# Patient Record
Sex: FEMALE | Race: WHITE | NOT HISPANIC OR LATINO | Employment: FULL TIME | URBAN - METROPOLITAN AREA
[De-identification: names, ages, dates, MRNs, and addresses within clinical notes are randomized per-mention and may not be internally consistent; named-entity substitution may affect disease eponyms.]

---

## 2019-05-01 ENCOUNTER — TELEPHONE (OUTPATIENT)
Dept: NEUROLOGY | Facility: CLINIC | Age: 34
End: 2019-05-01

## 2019-09-26 RX ORDER — CYCLOBENZAPRINE HCL 10 MG
10 TABLET ORAL
COMMUNITY
Start: 2019-04-05

## 2019-09-26 RX ORDER — IBUPROFEN 800 MG/1
800 TABLET ORAL
COMMUNITY
Start: 2019-06-14

## 2019-09-26 RX ORDER — LEVOCETIRIZINE DIHYDROCHLORIDE 5 MG/1
5 TABLET, FILM COATED ORAL
COMMUNITY
Start: 2017-08-23

## 2019-09-26 RX ORDER — METOCLOPRAMIDE 10 MG/1
10 TABLET ORAL
COMMUNITY
Start: 2019-05-07

## 2019-09-26 RX ORDER — MONTELUKAST SODIUM 10 MG/1
10 TABLET ORAL
COMMUNITY
Start: 2019-09-09

## 2019-09-26 RX ORDER — OLOPATADINE HYDROCHLORIDE 2 MG/ML
1 SOLUTION/ DROPS OPHTHALMIC
COMMUNITY
Start: 2019-06-14

## 2019-09-26 RX ORDER — NARATRIPTAN 2.5 MG/1
2.5 TABLET ORAL
COMMUNITY
Start: 2019-05-07

## 2019-09-26 RX ORDER — TRIAMCINOLONE ACETONIDE 0.1 %
PASTE (GRAM) DENTAL
COMMUNITY
Start: 2019-07-22

## 2019-09-26 RX ORDER — ACETAMINOPHEN AND CODEINE PHOSPHATE 300; 30 MG/1; MG/1
1 TABLET ORAL
COMMUNITY
Start: 2019-05-07

## 2020-09-29 DIAGNOSIS — M25.471 EFFUSION, RIGHT ANKLE: ICD-10-CM

## 2020-09-29 DIAGNOSIS — M25.571 PAIN IN RIGHT ANKLE AND JOINTS OF RIGHT FOOT: ICD-10-CM

## 2020-10-07 ENCOUNTER — HOSPITAL ENCOUNTER (OUTPATIENT)
Dept: MRI IMAGING | Facility: HOSPITAL | Age: 35
Discharge: HOME | End: 2020-10-07
Payer: COMMERCIAL

## 2020-10-07 DIAGNOSIS — M25.471 EFFUSION, RIGHT ANKLE: ICD-10-CM

## 2020-10-07 DIAGNOSIS — M25.571 PAIN IN RIGHT ANKLE AND JOINTS OF RIGHT FOOT: ICD-10-CM

## 2020-10-07 PROCEDURE — 73721 MRI JNT OF LWR EXTRE W/O DYE: CPT

## 2021-01-07 ENCOUNTER — IMMUNIZATIONS (OUTPATIENT)
Dept: FAMILY MEDICINE CLINIC | Facility: HOSPITAL | Age: 36
End: 2021-01-07

## 2021-01-07 DIAGNOSIS — Z23 ENCOUNTER FOR IMMUNIZATION: ICD-10-CM

## 2021-01-07 PROCEDURE — 0011A SARS-COV-2 / COVID-19 MRNA VACCINE (MODERNA) 100 MCG: CPT

## 2021-01-07 PROCEDURE — 91301 SARS-COV-2 / COVID-19 MRNA VACCINE (MODERNA) 100 MCG: CPT

## 2021-02-03 ENCOUNTER — IMMUNIZATIONS (OUTPATIENT)
Dept: FAMILY MEDICINE CLINIC | Facility: HOSPITAL | Age: 36
End: 2021-02-03

## 2021-02-03 DIAGNOSIS — Z23 ENCOUNTER FOR IMMUNIZATION: Primary | ICD-10-CM

## 2021-02-03 PROCEDURE — 0012A SARS-COV-2 / COVID-19 MRNA VACCINE (MODERNA) 100 MCG: CPT

## 2021-02-03 PROCEDURE — 91301 SARS-COV-2 / COVID-19 MRNA VACCINE (MODERNA) 100 MCG: CPT

## 2021-03-01 DIAGNOSIS — M79.89 OTHER SPECIFIED SOFT TISSUE DISORDERS: ICD-10-CM

## 2021-03-01 DIAGNOSIS — M79.604 PAIN IN RIGHT LEG: ICD-10-CM

## 2021-03-02 ENCOUNTER — HOSPITAL ENCOUNTER (OUTPATIENT)
Dept: NON INVASIVE DIAGNOSTICS | Facility: HOSPITAL | Age: 36
Discharge: HOME | End: 2021-03-02
Payer: COMMERCIAL

## 2021-03-02 DIAGNOSIS — M79.604 PAIN IN RIGHT LEG: ICD-10-CM

## 2021-03-02 DIAGNOSIS — M79.89 OTHER SPECIFIED SOFT TISSUE DISORDERS: ICD-10-CM

## 2021-03-02 PROCEDURE — 93971 EXTREMITY STUDY: CPT | Performed by: SURGERY

## 2021-03-02 PROCEDURE — 93971 EXTREMITY STUDY: CPT

## 2021-03-05 DIAGNOSIS — M25.561 PAIN IN RIGHT KNEE: ICD-10-CM

## 2021-03-05 DIAGNOSIS — M25.461 EFFUSION, RIGHT KNEE: ICD-10-CM

## 2023-01-31 ENCOUNTER — EVALUATION (OUTPATIENT)
Dept: PHYSICAL THERAPY | Facility: CLINIC | Age: 38
End: 2023-01-31

## 2023-01-31 VITALS — SYSTOLIC BLOOD PRESSURE: 100 MMHG | HEART RATE: 55 BPM | DIASTOLIC BLOOD PRESSURE: 64 MMHG | OXYGEN SATURATION: 98 %

## 2023-01-31 DIAGNOSIS — M43.10 RETROLISTHESIS: ICD-10-CM

## 2023-01-31 DIAGNOSIS — M54.50 LUMBAR SPINE PAIN: Primary | ICD-10-CM

## 2023-01-31 NOTE — LETTER
2023    Mireya Newman MD  [address] [address]    Patient: Gustavo Mosre   YOB: 1985   Date of Visit: 2023     Encounter Diagnosis     ICD-10-CM    1  Lumbar spine pain  M54 50       2  Retrolisthesis  M43 10           Dear Dr Mosquera Congress: Thank you for your recent referral of Gustavo Moser  Please review the attached evaluation summary from [address] [place] [address] recent visit  Please verify that you agree with the plan of care by signing the attached order  If you have any questions or concerns, please do not hesitate to call  I sincerely appreciate the opportunity to share in the care of one of your patients and hope to have another opportunity to work with you in the near future  Sincerely,    Chiquis Mccarthy, PT      Referring Provider:      I certify that I have read the below Plan of Care and certify the need for these services furnished under this plan of treatment while under my care  Mireya Newman MD  [address] [address]  Via Fax: [phone]          PT Evaluation     Today's date: 2023  Patient name: Gustavo Moser  : 1985  MRN: [id number]  Referring provider: Benita Ibanez MD  Dx:   Encounter Diagnosis     ICD-10-CM    1  Lumbar spine pain  M54 50       2  Retrolisthesis  M43 10                      Assessment  Assessment details: 41 yo female referred to PT for  Thoracolumbar pain, xray finding of retrolisthesis, thoracic scoliosis with impairments of pain,  global limitations with trunk ROM secondary to pain with provocation, decreased B LE flexibility and core strength, hypertonicity R thoracolumbar PVMs  Pt evaluation completed and treatment initiated today with patient education, gentle ROM exercises, core stabilization exercises and  Stretching of bilateral hamstrings  Pt was educated in proper lifting techniques and provided with HEP this date with ability to demonstrate return  Pt will benefit from skilled PT to achieve goals of care  Impairments: abnormal or restricted ROM, lacks appropriate home exercise program, pain with function and poor body mechanics    Symptom irritability: highUnderstanding of Dx/Px/POC: good   Prognosis: good    Goals  STG  2-4 weeks  1  Pt  Able to demonstrate proper lifting techniques with ability to carry 10-15 pounds waist to floor , pain no greater than 1/10  2  Pt  To demonstrate increase ROM of  thoraco lumbar spine L SB with no radiation of pain to allow for improved tolerance to stand and sit  20-30 min  LTG  4-6 weeks  1  Pt  To demonstrate increase in  Core strength to  4/5 to 4+/5 to allow ADLs with pain no greater than 1/10 Lumbar spine without radiation  2  Pt  To demonstrate  Increase in flexibility of B HS to  WNLs to allow improved body mechanics for  ADLs  3  Resolve of hypertonicity of R thoracolumbar  PVMs to allow return to full activity with children, work and home keeping with painno greater than 1/10    Plan  Patient would benefit from: skilled physical therapy  Planned modality interventions: cryotherapy and thermotherapy: hydrocollator packs  Planned therapy interventions: abdominal trunk stabilization, manual therapy, patient education, postural training, home exercise program, flexibility, body mechanics training and therapeutic exercise  Frequency: 2x week  Duration in weeks: 6  Plan of Care beginning date: 1/31/2023  Plan of Care expiration date: 3/14/2023  Treatment plan discussed with: patient        Subjective Evaluation    History of Present Illness  Date of onset: 1/18/2023 (1/18/23)  Mechanism of injury:  Onset of R  back pain lifting young  child out of bed in am at home while bending and twisting to L  travleld to work and pain began to radiate to distally to R hip, lateral leg and  lateral ankle/foot  Was [facility] by  [facility] and had xrays and tx wiith naproxen and ordered PT  Pt  Notes sx same   Denies any sensory irritation, weakness, changes in bowel or bladder  Not a recurrent problem   Quality of life: excellent    Pain  Current pain ratin  At best pain rating: 3  At worst pain ratin  Location:   R lower back, radiated to lateral hip with radiation into leg  when stands  Quality: pressure and burning  Relieving factors: change in position  Progression: no change    Social Support  Steps to enter house: no  Stairs in house: yes   Lives in: multiple-level home  Lives with: spouse and young children    Employment status: working  Hand dominance: left  Exercise history: 3 miles  walking 1 x per week    Treatments  No previous or current treatments  Patient Goals  Patient goals for therapy: decreased pain and increased motion  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, 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        Objective     Concurrent Complaints  Negative for night pain, disturbed sleep, bladder dysfunction, bowel dysfunction, saddle (S4) numbness, cardiac problem, kidney problem, gallbladder problem, stomach problem, ulcer, appendix problem, spleen problem, pancreas problem, history of cancer, history of trauma and infection    Static Posture   General Observations  Scoliosis  Comments  Compensated thoracolumbar scoliosis , increased height R shoulder/scapula    Palpation     Right   Hypertonic in the erector spinae  Tenderness of the erector spinae       Neurological Testing     Sensation     Lumbar   Left   Intact: light touch    Right   Intact: light touch    Additional Neurological Details  Negative modified slump test    Active Range of Motion   Cervical/Thoracic Spine       Thoracic    Flexion:  Restriction level: minimal  Extension:  Restriction level: moderate  Left lateral flexion:  Restriction level: minimal  Right lateral flexion:  Restriction level: minimal  Left rotation:  Restriction level: moderate  Right rotation:  Restriction level: minimal    Lumbar   Flexion:  Restriction level: moderate  Extension:  Restriction level: moderate  Left lateral flexion:  with pain Restriction level: moderate  Right lateral flexion:  Restriction level: minimal  Left rotation:  Restriction level: minimal  Right rotation:  with pain Restriction level: moderate    Passive Range of Motion     Lumbar   Flexion: with pain  Extension: with pain    Strength/Myotome Testing     Lumbar   Left   Normal strength    Right   Normal strength    Muscle Activation     Additional Muscle Activation Details  TA  Set fair  Abdominal strength grossly  4/5 without provocation of pain    Tests   Cervical   Negative vertical compression  Lumbar   Negative vertical compression, SIJ compression and sacral spring   Left   Negative femoral stretch and slump test      Right   Negative femoral stretch and slump test      Left Hip   Negative long sit  Right Hip   Negative long sit  Ambulation     Observational Gait   Gait: within functional limits     Functional Assessment        Comments  Full squat completed     General Comments:      Lumbar Comments  Hamstring flexibility decreased by  50% RLE, 30 % LLE  Flowsheet Rows    Flowsheet Row Most Recent Value   PT/OT G-Codes    Current Score 58   Projected Score 78            Precautions: retrolisthesis lumbar     Daily Treatment Diary:      Initial Evaluation Date: 01/31/23  Compliance 1/31                     Visit Number 1                    Re-Eval  IE                 MC   Foto Captured y                           1/31                     Manual                       Gr I  Rotary oscill of  lumbar spine for relax -                     STM LT/L PVMs -                                           Ther-Ex                      LTR rocking 1 min                        PPT/SKC 10 x 2, 3"                     Cat-cow -                     Dead bugs -                     Bridges with TA -                     Qped bird dog -                     Seated self HS stretch 3 x 30" HEP PC                     Neuro Re-Ed                                                                                                Ther-Act              lift , kettle bell carry -                                                Modalities                      HP/CP T/L spine -                                                         Access Code: [email or web address]  URL: [email or web address]  Date: 01/31/2023  Prepared by: Suyapa Guerra    Exercises  • Supine Posterior Pelvic Tilt - 2 x daily - 7 x weekly - 2 sets - 10 reps - 5 hold  • Supine Lower Trunk Rotation - 2 x daily - 7 x weekly - 3 sets - 30 reps  • Seated Hamstring Stretch - 2 x daily - 7 x weekly - 1 sets - 3 reps - 30 hold  • Hooklying Single Knee to Chest Stretch - 2 x daily - 7 x weekly - 1 sets - 10 reps - 5 hold    Patient Education  • Office Posture  • Lifting Techniques  • Lifting Techniques

## 2023-01-31 NOTE — PROGRESS NOTES
PT Evaluation     Today's date: 2023  Patient name: Gustavo Moser  : 1985  MRN: [id number]  Referring provider: Benita Ibanez MD  Dx:   Encounter Diagnosis     ICD-10-CM    1  Lumbar spine pain  M54 50       2  Retrolisthesis  M43 10                      Assessment  Assessment details: 39 yo female referred to PT for  Thoracolumbar pain, xray finding of retrolisthesis, thoracic scoliosis with impairments of pain,  global limitations with trunk ROM secondary to pain with provocation, decreased B LE flexibility and core strength, hypertonicity R thoracolumbar PVMs  Pt evaluation completed and treatment initiated today with patient education, gentle ROM exercises, core stabilization exercises and  Stretching of bilateral hamstrings  Pt was educated in proper lifting techniques and provided with HEP this date with ability to demonstrate return  Pt will benefit from skilled PT to achieve goals of care  Impairments: abnormal or restricted ROM, lacks appropriate home exercise program, pain with function and poor body mechanics    Symptom irritability: highUnderstanding of Dx/Px/POC: good   Prognosis: good    Goals  STG  2-4 weeks  1  Pt  Able to demonstrate proper lifting techniques with ability to carry 10-15 pounds waist to floor , pain no greater than 1/10  2  Pt  To demonstrate increase ROM of  thoraco lumbar spine L SB with no radiation of pain to allow for improved tolerance to stand and sit  20-30 min  LTG  4-6 weeks  1  Pt  To demonstrate increase in  Core strength to  4/5 to 4+/5 to allow ADLs with pain no greater than 1/10 Lumbar spine without radiation  2  Pt  To demonstrate  Increase in flexibility of B HS to  WNLs to allow improved body mechanics for  ADLs  3   Resolve of hypertonicity of R thoracolumbar  PVMs to allow return to full activity with children, work and home keeping with painno greater than 1/10    Plan  Patient would benefit from: skilled physical therapy  Planned modality interventions: cryotherapy and thermotherapy: hydrocollator packs  Planned therapy interventions: abdominal trunk stabilization, manual therapy, patient education, postural training, home exercise program, flexibility, body mechanics training and therapeutic exercise  Frequency: 2x week  Duration in weeks: 6  Plan of Care beginning date: 2023  Plan of Care expiration date: 3/14/2023  Treatment plan discussed with: patient        Subjective Evaluation    History of Present Illness  Date of onset: 2023 (23)  Mechanism of injury:  Onset of R  back pain lifting young  child out of bed in am at home while bending and twisting to L  [employer] to work and pain began to radiate to distally to R hip, lateral leg and  lateral ankle/foot  Was [facility] by  [facility] [place] and had xrays and tx wiith naproxen and ordered PT  Pt  Notes sx same  Denies any sensory irritation, weakness, changes in bowel or bladder             Not a recurrent problem   Quality of life: excellent    Pain  Current pain ratin  At best pain rating: 3  At worst pain ratin  Location:   R lower back, radiated to lateral hip with radiation into leg  when stands  Quality: pressure and burning  Relieving factors: change in position  Progression: no change    Social Support  Steps to enter house: no  Stairs in house: yes   Lives in: multiple-level home  Lives with: spouse and young children    Employment status: working  Hand dominance: left  Exercise history: 3 miles  walking 1 x per week    Treatments  No previous or current treatments  Patient Goals  Patient goals for therapy: decreased pain and increased motion  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, 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        Objective     Concurrent Complaints  Negative for night pain, disturbed sleep, bladder dysfunction, bowel dysfunction, saddle (S4) numbness, cardiac problem, kidney problem, gallbladder problem, stomach problem, ulcer, appendix problem, spleen problem, pancreas problem, history of cancer, history of trauma and infection    Static Posture   General Observations  Scoliosis  Comments  Compensated thoracolumbar scoliosis , increased height R shoulder/scapula    Palpation     Right   Hypertonic in the erector spinae  Tenderness of the erector spinae  Neurological Testing     Sensation     Lumbar   Left   Intact: light touch    Right   Intact: light touch    Additional Neurological Details  Negative modified slump test    Active Range of Motion   Cervical/Thoracic Spine       Thoracic    Flexion:  Restriction level: minimal  Extension:  Restriction level: moderate  Left lateral flexion:  Restriction level: minimal  Right lateral flexion:  Restriction level: minimal  Left rotation:  Restriction level: moderate  Right rotation:  Restriction level: minimal    Lumbar   Flexion:  Restriction level: moderate  Extension:  Restriction level: moderate  Left lateral flexion:  with pain Restriction level: moderate  Right lateral flexion:  Restriction level: minimal  Left rotation:  Restriction level: minimal  Right rotation:  with pain Restriction level: moderate    Passive Range of Motion     Lumbar   Flexion: with pain  Extension: with pain    Strength/Myotome Testing     Lumbar   Left   Normal strength    Right   Normal strength    Muscle Activation     Additional Muscle Activation Details  TA  Set fair  Abdominal strength grossly  4/5 without provocation of pain    Tests   Cervical   Negative vertical compression  Lumbar   Negative vertical compression, SIJ compression and sacral spring   Left   Negative femoral stretch and slump test      Right   Negative femoral stretch and slump test      Left Hip   Negative long sit  Right Hip   Negative long sit       Ambulation     Observational Gait   Gait: within functional limits     Functional Assessment        Comments  Full squat completed     General Comments:      Lumbar Comments  Hamstring flexibility decreased by  50% RLE, 30 % LLE  Flowsheet Rows    Flowsheet Row Most Recent Value   PT/OT G-Codes    Current Score 58   Projected Score 78             Precautions: retrolisthesis lumbar     Daily Treatment Diary:      Initial Evaluation Date: 01/31/23  Compliance 1/31                     Visit Number 1                    Re-Eval  IE                 MC   Foto Captured y                           1/31                     Manual                       Gr I  Rotary oscill of  lumbar spine for relax -                     STM LT/L PVMs -                                           Ther-Ex                      LTR rocking 1 min  PPT/SKC 10 x 2, 3"                     Cat-cow -                     Dead bugs -                     Bridges with TA -                     Qped bird dog -                     Seated self HS stretch 3 x 30"                                                                 HEP PC                     Neuro Re-Ed                                                                                                Ther-Act              lift , kettle bell carry -                                                Modalities                      HP/CP T/L spine -                                                          Access Code: [email or web address]  URL: [email or web address]  Date: 01/31/2023  Prepared by: Rbiana Starcher    Exercises  • Supine Posterior Pelvic Tilt - 2 x daily - 7 x weekly - 2 sets - 10 reps - 5 hold  • Supine Lower Trunk Rotation - 2 x daily - 7 x weekly - 3 sets - 30 reps  • Seated Hamstring Stretch - 2 x daily - 7 x weekly - 1 sets - 3 reps - 30 hold  • Hooklying Single Knee to Chest Stretch - 2 x daily - 7 x weekly - 1 sets - 10 reps - 5 hold    Patient Education  • Office Posture  • Lifting Techniques  • Lifting Techniques

## 2023-02-02 ENCOUNTER — OFFICE VISIT (OUTPATIENT)
Dept: PHYSICAL THERAPY | Facility: CLINIC | Age: 38
End: 2023-02-02

## 2023-02-02 DIAGNOSIS — M54.50 LUMBAR SPINE PAIN: Primary | ICD-10-CM

## 2023-02-02 DIAGNOSIS — M43.10 RETROLISTHESIS: ICD-10-CM

## 2023-02-02 NOTE — PROGRESS NOTES
Daily Note     Today's date: 2023  Patient name: Octaviano Rangel  : 1985  MRN: [id number]  Referring provider: Joan Sharp MD  Dx:   Encounter Diagnosis     ICD-10-CM    1  Lumbar spine pain  M54 50       2  Retrolisthesis  M43 10                      Subjective:" My back is about the same  I did the HEP"      Objective: See treatment diary below      Assessment: Tolerated treatment well  Patient did require frequent cues for technique with exercise progression today  HEP reviewed and progressed today  Xray  Report showing retrolisthesis at L4-5 noted  Patient would benefit from continued PT to increase strength,  mobility, flexibility and progress toward all goals of care  Plan: Continue per plan of care  Precautions: retrolisthesis lumbar     Daily Treatment Diary:      Initial Evaluation Date: 23  Compliance                    Visit Number 1 2                   Re-Eval  VAZQUEZ -                MC   Foto Captured y -                                             Manual                       Gr I  Rotary oscill of  lumbar spine for relax -                     STM LT/L PVMs -                                           Ther-Ex                      LTR rocking 1 min     3 min                   PPT/SKC 10 x 2, 3"  10 x 2 , 3"                   Cat-cow -  10x quad, 10 x seated                   LE walkouts with TA -  10x                   Bridges with TA -  2x10, 3"                   Qped bird dog -  2 x 10                   Seated self HS stretch 3 x 30"  3x30"                                                               HEP PC  review and progress                   Neuro Re-Ed                                                                                                Ther-Act              lift , kettle bell carry -                                                Modalities                      HP/CP T/L spine - -                                                   Access Code: [email or web address]  URL: [email or web address]  Date: 02/02/2023  Prepared by: Lola Garcia    Exercises  • Supine Posterior Pelvic Tilt - 2 x daily - 7 x weekly - 2 sets - 10 reps - 5 hold  • Supine Lower Trunk Rotation - 2 x daily - 7 x weekly - 3 sets - 30 reps  • Seated Hamstring Stretch - 2 x daily - 7 x weekly - 1 sets - 3 reps - 30 hold  • Hooklying Single Knee to Chest Stretch - 2 x daily - 7 x weekly - 1 sets - 10 reps - 5 hold  • Seated Cat Cow - 1 x daily - 7 x weekly - 1 sets - 10 reps - 3 hold  • Quadruped Cat Cow - 1 x daily - 7 x weekly - 1 sets - 10 reps - 3 hold    Patient Education  • Office Posture  • Lifting Techniques  • Lifting Techniques

## 2023-02-07 ENCOUNTER — APPOINTMENT (OUTPATIENT)
Dept: PHYSICAL THERAPY | Facility: CLINIC | Age: 38
End: 2023-02-07

## 2023-02-09 ENCOUNTER — OFFICE VISIT (OUTPATIENT)
Dept: PHYSICAL THERAPY | Facility: CLINIC | Age: 38
End: 2023-02-09

## 2023-02-09 DIAGNOSIS — M43.10 RETROLISTHESIS: ICD-10-CM

## 2023-02-09 DIAGNOSIS — M54.50 LUMBAR SPINE PAIN: Primary | ICD-10-CM

## 2023-02-09 NOTE — PROGRESS NOTES
Daily Note     Today's date: 2023  Patient name: Wayne Pryor  : 1985  MRN: [id number]  Referring provider: Cj Ledesma MD  Dx:   Encounter Diagnosis     ICD-10-CM    1  Lumbar spine pain  M54 50       2  Retrolisthesis  M43 10                      Subjective: I'm getting better and can do more with less pain  The  Pain has not radiated down my leg since my last PT visit"      Objective: See treatment diary below      Assessment: Pt  Tolerated care well today  She did progress to supine self HS stretching with  Strap 3 x 30" with good tolerance  Pt  lumbar flexion ROM improving and no longer painful with radiation into RLE, just note of tightness in R lumbar PVMs at end range 50-60% of full now  Pt  educated in self managements, activity modifications, posture training and  HEP review  Pt  Is responding well to treatment and will continue to benefit from further skilled care to achieve goals  Plan: Continue per plan of care  Precautions: retrolisthesis lumbar     Daily Treatment Diary:      Initial Evaluation Date: 23  Compliance                  Visit Number 1 2  3                 3Re-Eval  IE -  -              MC   Foto Captured y -  -                                         Manual                       Gr I  Rotary oscill of  lumbar spine for relax -                     STM LT/L PVMs -                                           Ther-Ex                      LTR rocking 1 min     3 min  3m                 PPT/SKC 10 x 2, 3"  10 x 2 , 3"  20x, 3/    10x10"                 Cat-cow -  10x quad, 10 x seated  Lower abd curl up swiss ball 2 x 10                 LE walkouts with TA -  10x 10x                 Bridges with TA -  2x10, 3"  2x10, 3"                 Qped bird dog -  2 x 10  dead bugs 2 x10                 Seated self HS stretch 3 x 30"  3x30"  supine 3x30"                 AROM UTR B, LTR B      2x15                 Pt  Education  Posture, activity mods     PC                 HEP PC  review and progress  review and  education in posture                 Neuro Re-Ed                                                                                                Ther-Act              lift , jose elias bell carry -                                                Modalities                      HP/CP T/L spine - - -                                                  Access Code: [email or web address]  URL: [email or web address]  Date: 02/02/2023  Prepared by: Chiquis Mccarthy    Exercises  • Supine Posterior Pelvic Tilt - 2 x daily - 7 x weekly - 2 sets - 10 reps - 5 hold  • Supine Lower Trunk Rotation - 2 x daily - 7 x weekly - 3 sets - 30 reps  • Seated Hamstring Stretch - 2 x daily - 7 x weekly - 1 sets - 3 reps - 30 hold  • Hooklying Single Knee to Chest Stretch - 2 x daily - 7 x weekly - 1 sets - 10 reps - 5 hold  • Seated Cat Cow - 1 x daily - 7 x weekly - 1 sets - 10 reps - 3 hold  • Quadruped Cat Cow - 1 x daily - 7 x weekly - 1 sets - 10 reps - 3 hold    Patient Education  • Office Posture  • Lifting Techniques  • Lifting Techniques

## 2023-02-14 ENCOUNTER — OFFICE VISIT (OUTPATIENT)
Dept: PHYSICAL THERAPY | Facility: CLINIC | Age: 38
End: 2023-02-14

## 2023-02-14 DIAGNOSIS — M43.10 RETROLISTHESIS: ICD-10-CM

## 2023-02-14 DIAGNOSIS — M54.50 LUMBAR SPINE PAIN: Primary | ICD-10-CM

## 2023-02-14 NOTE — PROGRESS NOTES
Daily Note     Today's date: 2023  Patient name: Xander Rodriguez  : 1985  MRN: [id number]  Referring provider: Gaby Pro MD  Dx:   Encounter Diagnosis     ICD-10-CM    1  Lumbar spine pain  M54 50       2  Retrolisthesis  M43 10                      Subjective: Patient reports she felt great after last session  She says she has no radicular symptoms into the R LE since beginning PT  She says she still has low back pain when she first starts moving and when she stops moving and sits down  Objective: See treatment diary below      Assessment: Progressed POC as outlined below  Tolerated treatment well without increase in symptoms  Patient required some postural cues throughout session  Mild R sided low back discomfort with paloff press today, but these remain tolerable especially following cues for core engagement  Moderate fatigue post session  Patient would benefit from continued PT to increase trunk mobility and core strength for improved function in daily activities  Plan: Continue per plan of care  Precautions: retrolisthesis lumbar     Daily Treatment Diary:      Initial Evaluation Date: 23  Compliance                Visit Number 1 2  3  4               3Re-Eval  IE -  -              MC   Foto Captured y -  -                                       Manual                       Gr I  Rotary oscill of  lumbar spine for relax -                     STM LT/L PVMs -                                         Ther-Ex                      LTR rocking 1 min     3 min  3m  3 min               PPT/SKC 10 x 2, 3"  10 x 2 , 3"  20x, 3/    10x10"  3"x20               Cat-cow -  10x quad, 10 x seated  Lower abd curl up swiss ball 2 x 10  Lower abd curl up swiss ball 2 x 10               LE walkouts with TA -  10x 10x  2x10               Bridges with TA -  2x10, 3"  2x10, 3"  2x10 3"               Pball crunch    3" x15         Qped bird dog -  2 x 10 dead bugs 2 x10  dead bugs 2 x10               Seated self HS stretch 3 x 30"  3x30"  supine 3x30"  supine 3x30"               AROM UTR B, LTR B      2x15  2x15               Pt  Education  Posture, activity mods     PC                 HEP PC  review and progress  review and  education in posture                 Neuro Re-Ed                      paloff press       RTB 5"x10 ea                                                                   Ther-Act              lift , jose elias bell carry -      farmer carry 10# 2' ea side                                          Modalities                      HP/CP T/L spine - - -                                                  Access Code: [email or web address]  URL: [email or web address]  Date: 02/02/2023  Prepared by: Diane Hyman    Exercises  • Supine Posterior Pelvic Tilt - 2 x daily - 7 x weekly - 2 sets - 10 reps - 5 hold  • Supine Lower Trunk Rotation - 2 x daily - 7 x weekly - 3 sets - 30 reps  • Seated Hamstring Stretch - 2 x daily - 7 x weekly - 1 sets - 3 reps - 30 hold  • Hooklying Single Knee to Chest Stretch - 2 x daily - 7 x weekly - 1 sets - 10 reps - 5 hold  • Seated Cat Cow - 1 x daily - 7 x weekly - 1 sets - 10 reps - 3 hold  • Quadruped Cat Cow - 1 x daily - 7 x weekly - 1 sets - 10 reps - 3 hold    Patient Education  • Office Posture  • Lifting Techniques  • Lifting Techniques

## 2023-02-16 ENCOUNTER — APPOINTMENT (OUTPATIENT)
Dept: PHYSICAL THERAPY | Facility: CLINIC | Age: 38
End: 2023-02-16

## 2023-02-23 ENCOUNTER — OFFICE VISIT (OUTPATIENT)
Dept: PHYSICAL THERAPY | Facility: CLINIC | Age: 38
End: 2023-02-23

## 2023-02-23 DIAGNOSIS — M54.50 LUMBAR SPINE PAIN: Primary | ICD-10-CM

## 2023-02-23 DIAGNOSIS — M43.10 RETROLISTHESIS: ICD-10-CM

## 2023-02-23 NOTE — PROGRESS NOTES
Daily Note     Today's date: 2023  Patient name: Murtaza Olvera  : 1985  MRN: [id number]  Referring provider: Magda Wang MD  Dx:   Encounter Diagnosis     ICD-10-CM    1  Lumbar spine pain  M54 50       2  Retrolisthesis  M43 10                      Subjective: Patient notes she had another migraine last week and this is the third one this month  She says she was put on a medrol dose pack  Murtaza Olvera says her back feels good because of this  She says she has been compliant with core program at home  Objective: See treatment diary below      Assessment: Tolerated treatment well with no complaint  Patient required some verbal cues to maintain proper core engagement especially with standing exercises  Patient would benefit from continued PT to increase trunk mobility and core strength for improved function in ADLs  Plan: Continue per plan of care  Precautions: retrolisthesis lumbar     Daily Treatment Diary:      Initial Evaluation Date: 23  Compliance              Visit Number 1 2  3  4  5             3Re-Eval  IE -  -              MC   Foto Captured y -  -                                     Manual                       Gr I  Rotary oscill of  lumbar spine for relax -                     STM LT/L PVMs -                                         Ther-Ex                      LTR rocking 1 min     3 min  3m  3 min  3 min             PPT/SKC 10 x 2, 3"  10 x 2 , 3"  20x, 3/    10x10"  3"x20  5"x20             Cat-cow -  10x quad, 10 x seated  Lower abd curl up swiss ball 2 x 10  Lower abd curl up swiss ball 2 x 10  Lower abd curl up swiss ball 2 x 10             LE walkouts with TA -  10x 10x  2x10  2x10             Bridges with TA -  2x10, 3"  2x10, 3"  2x10 3"  3x10 3"             Pball crunch    3" x15 3"x15        Qped bird dog -  2 x 10  dead bugs 2 x10  dead bugs 2 x10  dead bugs 2 x10             Seated self HS stretch 3 x 30"  3x30"  supine 3x30"  supine 3x30"  supine 3x30"             AROM UTR B, LTR B      2x15  2x15  2x15             Leg press     65# 2x10        Pt  Education  Posture, activity mods     PC                 HEP PC  review and progress  review and  education in posture                 Neuro Re-Ed                      paloff press       RTB 5"x10 ea  RTB 5"x10 ea             standing alt march c TrA, TB at side     RTB 20x        PPT bicycle     2x max                                               Ther-Act              lift , irmatle bell carry -      farmer carry 10# 2' ea side  def                                        Modalities                      HP/CP T/L spine - - -                                                  Access Code: [email or web address]  URL: [email or web address]  Date: 02/02/2023  Prepared by: Terrial     Exercises  • Supine Posterior Pelvic Tilt - 2 x daily - 7 x weekly - 2 sets - 10 reps - 5 hold  • Supine Lower Trunk Rotation - 2 x daily - 7 x weekly - 3 sets - 30 reps  • Seated Hamstring Stretch - 2 x daily - 7 x weekly - 1 sets - 3 reps - 30 hold  • Hooklying Single Knee to Chest Stretch - 2 x daily - 7 x weekly - 1 sets - 10 reps - 5 hold  • Seated Cat Cow - 1 x daily - 7 x weekly - 1 sets - 10 reps - 3 hold  • Quadruped Cat Cow - 1 x daily - 7 x weekly - 1 sets - 10 reps - 3 hold    Patient Education  • Office Posture  • Lifting Techniques  • Lifting Techniques

## 2023-03-24 ENCOUNTER — HOSPITAL ENCOUNTER (OUTPATIENT)
Dept: CT IMAGING | Facility: HOSPITAL | Age: 38
End: 2023-03-24

## 2023-03-24 DIAGNOSIS — J32.9 CHRONIC SINUSITIS, UNSPECIFIED: ICD-10-CM

## 2023-03-24 DIAGNOSIS — G43.109 MIGRAINE WITH AURA, NOT INTRACTABLE, WITHOUT STATUS MIGRAINOSUS: ICD-10-CM

## 2023-09-11 ENCOUNTER — HOSPITAL ENCOUNTER (OUTPATIENT)
Dept: CT IMAGING | Facility: HOSPITAL | Age: 38
Discharge: HOME | End: 2023-09-11
Payer: COMMERCIAL

## 2023-09-11 DIAGNOSIS — R10.31 RIGHT LOWER QUADRANT PAIN: ICD-10-CM

## 2023-09-11 PROCEDURE — 74177 CT ABD & PELVIS W/CONTRAST: CPT

## 2023-09-11 RX ADMIN — IOHEXOL 100 ML: 350 INJECTION, SOLUTION INTRAVENOUS at 15:43

## 2023-09-11 RX ADMIN — IOHEXOL 50 ML: 240 INJECTION, SOLUTION INTRATHECAL; INTRAVASCULAR; INTRAVENOUS; ORAL at 15:42

## 2023-12-07 ENCOUNTER — HOSPITAL ENCOUNTER (OUTPATIENT)
Dept: MRI IMAGING | Facility: HOSPITAL | Age: 38
End: 2023-12-07
Payer: COMMERCIAL

## 2023-12-07 DIAGNOSIS — M25.571 PAIN IN RIGHT ANKLE AND JOINTS OF RIGHT FOOT: ICD-10-CM

## 2023-12-07 DIAGNOSIS — G89.29 OTHER CHRONIC PAIN: ICD-10-CM

## 2023-12-07 PROCEDURE — 73721 MRI JNT OF LWR EXTRE W/O DYE: CPT
